# Patient Record
Sex: FEMALE | Race: WHITE | NOT HISPANIC OR LATINO
[De-identification: names, ages, dates, MRNs, and addresses within clinical notes are randomized per-mention and may not be internally consistent; named-entity substitution may affect disease eponyms.]

---
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
## 2020-05-08 RX ORDER — ABATACEPT 250 MG/15ML
250 INJECTION, POWDER, LYOPHILIZED, FOR SOLUTION INTRAVENOUS
Qty: 6 | Refills: 6 | Status: DISCONTINUED | COMMUNITY
Start: 2019-10-15 | End: 2020-05-08

## 2020-08-13 ENCOUNTER — APPOINTMENT (OUTPATIENT)
Dept: PEDIATRIC RHEUMATOLOGY | Facility: CLINIC | Age: 20
End: 2020-08-13

## 2020-08-19 ENCOUNTER — APPOINTMENT (OUTPATIENT)
Dept: PEDIATRIC RHEUMATOLOGY | Facility: CLINIC | Age: 20
End: 2020-08-19
Payer: COMMERCIAL

## 2020-08-19 PROCEDURE — 99214 OFFICE O/P EST MOD 30 MIN: CPT | Mod: 95

## 2020-08-19 NOTE — REVIEW OF SYSTEMS
[NI] : Endocrine [Nl] : Hematologic/Lymphatic [Menarche] : ~T menarche [Joint Pains] : arthralgias [AM Stiffness] : am stiffness [Immunizations are up to date] : Immunizations are up to date [Fever] : no fever [Nasal Stuffiness] : no nasal congestion [Rash] : no rash [Oral Ulcers] : no oral ulcers [Edema] : no edema [Chest Pain] : no chest pain or discomfort [Abdominal Pain] : no abdominal pain [Cough] : no cough [Diarrhea] : no diarrhea [Limping] : no limping [Joint Swelling] : no joint swelling [Headache] : no headache [Seizure] : no seizures [Muscle Aches] : no muscle aches [Seasonal Allergies] : no seasonal allergies [Sleep Disturbances] : ~T no sleep disturbances [Cold Intolerance] : cold tolerant [Smokers in Home] : no one in home smokes [FreeTextEntry1] : records kept at [facility] office\par Influenza vaccine 2014-15\par

## 2020-08-19 NOTE — SOCIAL HISTORY
[Mother] : mother [___ Brothers] : [unfilled] brothers [Grade:  _____] : Grade: [unfilled] [___ Sisters] : [unfilled] sisters [de-identified] : dog

## 2020-08-19 NOTE — PHYSICAL EXAM
[Conjunctiva] : normal conjunctiva [Ears] : normal ears [Pupils] : pupils were equal and round [Gums] : normal gums [Oropharynx] : normal oropharynx [Oral] : normal oral cavity  [Palate] : normal palate [Gait] : normal gait [Not Examined] : not examined [Grossly Intact] : grossly intact [Normal] : normal [_______] : Left TMJ: [unfilled] [Rash] : no rash [Lesions] : no lesions [Malar Erythema] : no malar erythema [Ulcers] : no ulcers [Erythematous] : not erythematous [Range Of Motion] : limited  range of motion [FreeTextEntry1] : in NAD [de-identified] : full ROM

## 2020-08-19 NOTE — HISTORY OF PRESENT ILLNESS
[Other Location: e.g. School (Enter Location, City,State)___] : at [unfilled], at the time of the visit. [Verbal consent obtained from patient] : the patient, [unfilled] [Other Location: e.g. 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

## 2020-09-29 RX ORDER — ABATACEPT 250 MG/15ML
250 INJECTION, POWDER, LYOPHILIZED, FOR SOLUTION INTRAVENOUS
Qty: 1 | Refills: 5 | Status: DISCONTINUED | COMMUNITY
Start: 2018-10-26 | End: 2020-09-29

## 2020-09-29 RX ORDER — ABATACEPT 250 MG/15ML
250 INJECTION, POWDER, LYOPHILIZED, FOR SOLUTION INTRAVENOUS
Qty: 1 | Refills: 5 | Status: DISCONTINUED | COMMUNITY
Start: 2019-10-16 | End: 2020-09-29

## 2020-09-29 RX ORDER — TOFACITINIB 11 MG/1
11 TABLET, FILM COATED, EXTENDED RELEASE ORAL
Qty: 30 | Refills: 1 | Status: DISCONTINUED | COMMUNITY
Start: 2019-03-21 | End: 2020-09-29

## 2020-11-11 ENCOUNTER — RX RENEWAL (OUTPATIENT)
Age: 20
End: 2020-11-11

## 2021-01-05 ENCOUNTER — RX RENEWAL (OUTPATIENT)
Age: 21
End: 2021-01-05

## 2021-01-11 ENCOUNTER — RX RENEWAL (OUTPATIENT)
Age: 21
End: 2021-01-11

## 2021-01-13 ENCOUNTER — APPOINTMENT (OUTPATIENT)
Dept: PEDIATRIC RHEUMATOLOGY | Facility: CLINIC | Age: 21
End: 2021-01-13
Payer: COMMERCIAL

## 2021-01-13 ENCOUNTER — RX RENEWAL (OUTPATIENT)
Age: 21
End: 2021-01-13

## 2021-01-13 PROCEDURE — 99215 OFFICE O/P EST HI 40 MIN: CPT | Mod: 95

## 2021-01-14 NOTE — SOCIAL HISTORY
[Mother] : mother [___ Brothers] : [unfilled] brothers [___ Sisters] : [unfilled] sisters [Grade:  _____] : Grade: [unfilled] [de-identified] : dog

## 2021-01-14 NOTE — REVIEW OF SYSTEMS
[NI] : Endocrine [Nl] : Hematologic/Lymphatic [Menarche] : ~T menarche [Joint Pains] : arthralgias [AM Stiffness] : am stiffness [Immunizations are up to date] : Immunizations are up to date [Fever] : no fever [Rash] : no rash [Nasal Stuffiness] : no nasal congestion [Oral Ulcers] : no oral ulcers [Edema] : no edema [Chest Pain] : no chest pain or discomfort [Cough] : no cough [Diarrhea] : no diarrhea [Abdominal Pain] : no abdominal pain [Limping] : no limping [Joint Swelling] : no joint swelling [Muscle Aches] : no muscle aches [Seizure] : no seizures [Headache] : no headache [Sleep Disturbances] : ~T no sleep disturbances [Cold Intolerance] : cold tolerant [Seasonal Allergies] : no seasonal allergies [Smokers in Home] : no one in home smokes [FreeTextEntry1] : [facility]\par Influenza vaccine 2014-15\par

## 2021-01-14 NOTE — HISTORY OF PRESENT ILLNESS
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 School (Enter Location, City,State)___] : at [unfilled], at the time of the visit. [Other Location: e.g. Home (Enter Location, City,State)___] : at [unfilled] [Verbal consent obtained from patient] : the patient, [unfilled] [CHRISTY Positive] : - CHRISTY negative [Iritis] : no ~M iritis [Cataracts] : no cataracts [Glaucoma] : no glaucoma [de-identified] : last seen Aug 2020 by telehealth [FreeTextEntry3] : 2006 [FreeTextEntry1] : 1-2 hours of am stiffness [de-identified] : doing cheerleading

## 2021-01-14 NOTE — PHYSICAL EXAM
[Conjunctiva] : normal conjunctiva [Pupils] : pupils were equal and round [Gait] : normal gait [Grossly Intact] : grossly intact [Normal] : normal [Not Examined] : not examined [_______] : Left TMJ: [unfilled] [Rash] : no rash [Lesions] : no lesions [Malar Erythema] : no malar erythema [Ulcers] : no ulcers [Erythematous] : not erythematous [Range Of Motion] : limited  range of motion [FreeTextEntry1] : in NAD [de-identified] : full ROM

## 2021-01-22 ENCOUNTER — RX RENEWAL (OUTPATIENT)
Age: 21
End: 2021-01-22

## 2021-01-26 ENCOUNTER — RX RENEWAL (OUTPATIENT)
Age: 21
End: 2021-01-26

## 2022-02-03 ENCOUNTER — APPOINTMENT (OUTPATIENT)
Dept: PEDIATRIC RHEUMATOLOGY | Facility: CLINIC | Age: 22
End: 2022-02-03
Payer: COMMERCIAL

## 2022-02-03 VITALS
HEIGHT: 67.4 IN | WEIGHT: 113.32 LBS | DIASTOLIC BLOOD PRESSURE: 73 MMHG | SYSTOLIC BLOOD PRESSURE: 110 MMHG | HEART RATE: 137 BPM | BODY MASS INDEX: 17.58 KG/M2 | TEMPERATURE: 97.6 F

## 2022-02-03 PROCEDURE — 99215 OFFICE O/P EST HI 40 MIN: CPT

## 2022-02-03 RX ORDER — ABATACEPT 125 MG/ML
125 INJECTION, SOLUTION SUBCUTANEOUS
Qty: 4 | Refills: 2 | Status: ACTIVE | COMMUNITY
Start: 2020-08-19 | End: 1900-01-01

## 2022-02-03 NOTE — HISTORY OF PRESENT ILLNESS
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 School (Enter Location, City,State)___] : at [unfilled], at the time of the visit. [Other Location: e.g. 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

## 2022-02-03 NOTE — PHYSICAL EXAM
[Pupils] : pupils were equal and round [Gait] : normal gait [_______] : Ankle: [unfilled] [Rash] : no rash [Lesions] : no lesions [Malar Erythema] : no malar erythema [Ulcers] : no ulcers [Erythematous] : not erythematous [FreeTextEntry1] : in NAD [de-identified] : full ROM

## 2022-02-03 NOTE — SOCIAL HISTORY
[Mother] : mother [___ Brothers] : [unfilled] brothers [___ Sisters] : [unfilled] sisters [Grade:  _____] : Grade: [unfilled] [de-identified] : dog

## 2022-02-04 ENCOUNTER — NON-APPOINTMENT (OUTPATIENT)
Age: 22
End: 2022-02-04

## 2022-02-04 LAB
ALBUMIN SERPL ELPH-MCNC: 4.4 G/DL
ALP BLD-CCNC: 130 U/L
ALT SERPL-CCNC: 25 U/L
ANION GAP SERPL CALC-SCNC: 15 MMOL/L
AST SERPL-CCNC: 27 U/L
BASOPHILS # BLD AUTO: 0.01 K/UL
BASOPHILS NFR BLD AUTO: 0.2 %
BILIRUB SERPL-MCNC: 0.6 MG/DL
BUN SERPL-MCNC: 5 MG/DL
CALCIUM SERPL-MCNC: 10.3 MG/DL
CHLORIDE SERPL-SCNC: 103 MMOL/L
CO2 SERPL-SCNC: 22 MMOL/L
CREAT SERPL-MCNC: 0.35 MG/DL
CRP SERPL-MCNC: <3 MG/L
EOSINOPHIL # BLD AUTO: 0.28 K/UL
EOSINOPHIL NFR BLD AUTO: 6 %
ERYTHROCYTE [SEDIMENTATION RATE] IN BLOOD BY WESTERGREN METHOD: 13 MM/HR
GLUCOSE SERPL-MCNC: 91 MG/DL
HCT VFR BLD CALC: 36.2 %
HGB BLD-MCNC: 11.9 G/DL
IMM GRANULOCYTES NFR BLD AUTO: 0.2 %
LYMPHOCYTES # BLD AUTO: 1.58 K/UL
LYMPHOCYTES NFR BLD AUTO: 33.6 %
MAN DIFF?: NORMAL
MCHC RBC-ENTMCNC: 26.1 PG
MCHC RBC-ENTMCNC: 32.9 GM/DL
MCV RBC AUTO: 79.4 FL
MONOCYTES # BLD AUTO: 0.43 K/UL
MONOCYTES NFR BLD AUTO: 9.1 %
NEUTROPHILS # BLD AUTO: 2.39 K/UL
NEUTROPHILS NFR BLD AUTO: 50.9 %
PLATELET # BLD AUTO: 251 K/UL
POTASSIUM SERPL-SCNC: 4.7 MMOL/L
PROT SERPL-MCNC: 6.9 G/DL
RBC # BLD: 4.56 M/UL
RBC # FLD: 13.1 %
SODIUM SERPL-SCNC: 140 MMOL/L
WBC # FLD AUTO: 4.7 K/UL

## 2022-02-07 ENCOUNTER — NON-APPOINTMENT (OUTPATIENT)
Age: 22
End: 2022-02-07

## 2022-02-07 LAB
M TB IFN-G BLD-IMP: NEGATIVE
QUANTIFERON TB PLUS MITOGEN MINUS NIL: 10 IU/ML
QUANTIFERON TB PLUS NIL: 0 IU/ML
QUANTIFERON TB PLUS TB1 MINUS NIL: 0 IU/ML
QUANTIFERON TB PLUS TB2 MINUS NIL: 0 IU/ML